# Patient Record
Sex: FEMALE | Race: ASIAN | NOT HISPANIC OR LATINO | URBAN - METROPOLITAN AREA
[De-identification: names, ages, dates, MRNs, and addresses within clinical notes are randomized per-mention and may not be internally consistent; named-entity substitution may affect disease eponyms.]

---

## 2018-07-30 ENCOUNTER — EMERGENCY (EMERGENCY)
Facility: HOSPITAL | Age: 68
LOS: 1 days | Discharge: HOME | End: 2018-07-30
Attending: EMERGENCY MEDICINE | Admitting: EMERGENCY MEDICINE
Payer: MEDICARE

## 2018-07-30 VITALS
OXYGEN SATURATION: 97 % | DIASTOLIC BLOOD PRESSURE: 100 MMHG | SYSTOLIC BLOOD PRESSURE: 160 MMHG | TEMPERATURE: 98 F | HEART RATE: 81 BPM | RESPIRATION RATE: 16 BRPM

## 2018-07-30 PROCEDURE — 99283 EMERGENCY DEPT VISIT LOW MDM: CPT | Mod: 25,GC

## 2018-07-30 PROCEDURE — 70450 CT HEAD/BRAIN W/O DYE: CPT | Mod: 26

## 2018-07-30 PROCEDURE — 12001 RPR S/N/AX/GEN/TRNK 2.5CM/<: CPT

## 2018-07-30 RX ORDER — TETANUS TOXOID, REDUCED DIPHTHERIA TOXOID AND ACELLULAR PERTUSSIS VACCINE, ADSORBED 5; 2.5; 8; 8; 2.5 [IU]/.5ML; [IU]/.5ML; UG/.5ML; UG/.5ML; UG/.5ML
0.5 SUSPENSION INTRAMUSCULAR ONCE
Qty: 0 | Refills: 0 | Status: COMPLETED | OUTPATIENT
Start: 2018-07-30 | End: 2018-07-30

## 2018-07-30 RX ADMIN — TETANUS TOXOID, REDUCED DIPHTHERIA TOXOID AND ACELLULAR PERTUSSIS VACCINE, ADSORBED 0.5 MILLILITER(S): 5; 2.5; 8; 8; 2.5 SUSPENSION INTRAMUSCULAR at 01:56

## 2018-07-30 NOTE — ED PROVIDER NOTE - OBJECTIVE STATEMENT
68f w hx HTN here after mechanical fall at home. Pt states was walking on slippery floor w slippers when fell down and hit head, causing scalp lac. No LOC, not on a/c, no h/a vomiting ro visual changes. No preceding cp, sob or lightheadedness. Does not recall last tetanus.

## 2018-07-30 NOTE — ED ADULT NURSE NOTE - CHIEF COMPLAINT QUOTE
PT [facility] from Home,  Lacerated wound on Right Parietal area s/p  tripped and fall. Denies LOC Dizziness SOB CP Palpitation N V prior and after the fall. No active bleeding right now Not on any blood thinner.

## 2018-07-30 NOTE — ED PROVIDER NOTE - CARE PLAN
Principal Discharge DX:	Scalp laceration, initial encounter  Assessment and plan of treatment:	You were seen in the emergency department for a fall. Please follow up with your primary doctor in the next 3-5 days. Visit your primary doctor, go to an urgent care, or return to the emergency department in 10 days to have the staples removed. Keep the affected area clean and dry. Return to the emergency department immediately if you experience headache, changes in vision, fever, drainage of pus from or redness around the wound, or any other concerning symptoms.  Secondary Diagnosis:	Fall, initial encounter

## 2018-07-30 NOTE — ED PROVIDER NOTE - PLAN OF CARE
You were seen in the emergency department for a fall. Please follow up with your primary doctor in the next 3-5 days. Visit your primary doctor, go to an urgent care, or return to the emergency department in 10 days to have the staples removed. Keep the affected area clean and dry. Return to the emergency department immediately if you experience headache, changes in vision, fever, drainage of pus from or redness around the wound, or any other concerning symptoms.

## 2018-07-30 NOTE — ED ADULT NURSE NOTE - OBJECTIVE STATEMENT
pt aox4; reports mechanical fall on slippery floor today.  Pt denies loc/ dizziness. Laceration noted to right side of scalp. MD Goldberg at bedside for lac repair. Pt unable to recall last tdap vaccine.  Tdap vaccine administered as per order. Pt went for ct; pending results. Denies pain. No other abrasions/lacerations noted. Will continue to monitor/assess.

## 2018-07-30 NOTE — ED PROVIDER NOTE - MEDICAL DECISION MAKING DETAILS
68f w htn here for r scalp lac after mechanical fall. NAD, nl neuro exam, small scalp lac. Will check cth, tdap, repair lac, reassess likely dc. Pt does not want pain meds

## 2018-07-30 NOTE — ED PROVIDER NOTE - ATTENDING CONTRIBUTION TO CARE
agree with resident note  68 yr old female with PMh of HTN presents after mechanical fall at home. Denies LOC, vomiting.  Sustained 2 cm laceration to right temporal region.    PE: well appearing; Scalp 2 cm laceration; PERRL; CTAB/L; s1 s 2no m/r/g abd soft/NT/ND ext: no edema Neuro: Cns intact 5/5 motor UE and LE; sensation intact; gait stable

## 2018-08-08 ENCOUNTER — EMERGENCY (EMERGENCY)
Facility: HOSPITAL | Age: 68
LOS: 1 days | Discharge: HOME | End: 2018-08-08
Admitting: EMERGENCY MEDICINE

## 2018-08-08 VITALS
HEART RATE: 75 BPM | SYSTOLIC BLOOD PRESSURE: 152 MMHG | RESPIRATION RATE: 16 BRPM | OXYGEN SATURATION: 97 % | DIASTOLIC BLOOD PRESSURE: 79 MMHG | TEMPERATURE: 98 F

## 2018-08-08 NOTE — ED PROVIDER NOTE - OBJECTIVE STATEMENT
Pt is a 69 y/o F nonsmoker PMHx HTN p/w staple removal.  Pt states she fells 8 days ago, causing laceration to scalp for which pt received two staples and had CT which was normal.  Pt offers no acute complaints.  Denies any headaches, fevers, chills, pain, redness, swelling, purulent drainage.

## 2018-08-08 NOTE — ED PROVIDER NOTE - CHPI ED SYMPTOMS NEG
no redness/no fever/no inflammation/no drainage/no bleeding/no red streaks/no pain/no purulent drainage/no bleeding at site/no chills

## 2018-08-08 NOTE — ED PROVIDER NOTE - MEDICAL DECISION MAKING DETAILS
Pt is a 69 y/o F nonsmoker PMHx HTN p/w staple removal -- well healed stapled laceration -- staple removal

## 2018-08-08 NOTE — ED PROVIDER NOTE - PLAN OF CARE
Advance activity as tolerated.  Continue all previously prescribed medications as directed unless otherwise instructed.  Apply bacitracin twice a day for 1 week.  Follow up with your primary care physician in 48-72 hours- bring copies of your results.  Return to the ER for worsening or persistent symptoms, and/or ANY NEW OR CONCERNING SYMPTOMS. If you have issues obtaining follow up, please call: [phone] to obtain a doctor or specialist who takes your insurance in your area.  You may call [phone] to make an appointment with the internal medicine clinic.

## 2018-08-08 NOTE — ED PROVIDER NOTE - CARE PLAN
Principal Discharge DX:	Suture check  Assessment and plan of treatment:	Advance activity as tolerated.  Continue all previously prescribed medications as directed unless otherwise instructed.  Apply bacitracin twice a day for 1 week.  Follow up with your primary care physician in 48-72 hours- bring copies of your results.  Return to the ER for worsening or persistent symptoms, and/or ANY NEW OR CONCERNING SYMPTOMS. If you have issues obtaining follow up, please call: [phone] to obtain a doctor or specialist who takes your insurance in your area.  You may call [phone] to make an appointment with the internal medicine clinic.

## 2018-08-09 PROBLEM — I10 ESSENTIAL (PRIMARY) HYPERTENSION: Chronic | Status: ACTIVE | Noted: 2018-07-30

## 2020-02-05 ENCOUNTER — APPOINTMENT (OUTPATIENT)
Dept: OPHTHALMOLOGY | Facility: CLINIC | Age: 70
End: 2020-02-05
Payer: MEDICARE

## 2020-02-05 ENCOUNTER — NON-APPOINTMENT (OUTPATIENT)
Age: 70
End: 2020-02-05

## 2020-02-05 PROCEDURE — 92004 COMPRE OPH EXAM NEW PT 1/>: CPT

## 2020-02-05 PROCEDURE — 92015 DETERMINE REFRACTIVE STATE: CPT
